# Patient Record
Sex: FEMALE | URBAN - METROPOLITAN AREA
[De-identification: names, ages, dates, MRNs, and addresses within clinical notes are randomized per-mention and may not be internally consistent; named-entity substitution may affect disease eponyms.]

---

## 2017-04-13 ENCOUNTER — IMPORTED ENCOUNTER (OUTPATIENT)
Dept: URBAN - METROPOLITAN AREA CLINIC 31 | Facility: CLINIC | Age: 67
End: 2017-04-13

## 2017-04-13 PROCEDURE — 92310 CONTACT LENS FITTING OU: CPT

## 2017-04-13 PROCEDURE — 92015 DETERMINE REFRACTIVE STATE: CPT

## 2017-04-13 PROCEDURE — 92014 COMPRE OPH EXAM EST PT 1/>: CPT

## 2017-11-29 ENCOUNTER — IMPORTED ENCOUNTER (OUTPATIENT)
Dept: URBAN - METROPOLITAN AREA CLINIC 31 | Facility: CLINIC | Age: 67
End: 2017-11-29

## 2017-11-29 PROBLEM — T15.12XA: Noted: 2017-11-29

## 2017-11-29 PROCEDURE — 99213 OFFICE O/P EST LOW 20 MIN: CPT

## 2017-11-29 NOTE — PATIENT DISCUSSION
1.  A conjunctival foreign body (CL fragment) was present OS --The risks benefits and alternatives of foreign body removal were discussed. Antiobiotic drops were applied and the foreign body was successfully removed. 2. Return for an appointment in 6 months for comprehensive exam. with Dr. Shital Roper

## 2018-01-10 ENCOUNTER — IMPORTED ENCOUNTER (OUTPATIENT)
Dept: URBAN - METROPOLITAN AREA CLINIC 31 | Facility: CLINIC | Age: 68
End: 2018-01-10

## 2018-01-10 PROBLEM — H18.59: Noted: 2018-01-10

## 2018-01-10 PROBLEM — H25.813: Noted: 2018-01-10

## 2018-01-10 PROBLEM — H17.11: Noted: 2018-01-10

## 2018-01-10 PROCEDURE — 92134 CPTRZ OPH DX IMG PST SGM RTA: CPT

## 2018-01-10 PROCEDURE — 92015 DETERMINE REFRACTIVE STATE: CPT

## 2018-01-10 PROCEDURE — 92025 CPTRIZED CORNEAL TOPOGRAPHY: CPT

## 2018-01-10 PROCEDURE — 92014 COMPRE OPH EXAM EST PT 1/>: CPT

## 2018-01-10 NOTE — PATIENT DISCUSSION
1.  Discussed the risks benefits alternatives and limitations of cataract surgery including infection bleeding loss of vision retinal tears detachment. The patient stated a full understanding and a desire to proceed with the procedure in both eyes. Refractive options were reviewed. Patient has elected to have monovision. The patient has tried monovision in the past.  I have recommended ORA guidance to confirm lens power choice. There is no guarantee of perfect uncorrected acuity and the possible need for enhancement was discussed. The patient may need glasses for night driving and to fine tune the vision. 2. 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 Mild ABMD4. Return for an appointment in 1 month for cataract evaluation. with Dr. Lauren Faye.

## 2018-02-20 ENCOUNTER — IMPORTED ENCOUNTER (OUTPATIENT)
Dept: URBAN - METROPOLITAN AREA CLINIC 31 | Facility: CLINIC | Age: 68
End: 2018-02-20

## 2018-02-20 PROBLEM — H17.89: Noted: 2018-02-20

## 2018-02-20 PROBLEM — H17.11: Noted: 2018-02-20

## 2018-02-20 PROBLEM — H25.813: Noted: 2018-02-20

## 2018-02-20 PROBLEM — H04.123: Noted: 2018-02-20

## 2018-02-20 PROCEDURE — 99214 OFFICE O/P EST MOD 30 MIN: CPT

## 2018-02-20 NOTE — PATIENT DISCUSSION
1.  Discussed the risks benefits alternatives and limitations of cataract surgery including infection bleeding loss of vision retinal tears detachment. The patient stated a full understanding and a desire to proceed with the procedure in both eyes. Refractive options were reviewed. Patient has elected to have monovision. The patient has tried monovision in the past.  I have recommended ORA guidance to confirm lens power choice. There is no guarantee of perfect uncorrected acuity and the possible need for enhancement was discussed. The patient may need glasses for night driving and to fine tune the vision. h/o [address] in past pt aware IOL calcs are more difficult and possible need for enhancement following surgerySchedule KPe/IOL OD/OS pore OD reading OS distance2. Dry Eye OU:  Continue current management with Artificial Tears. Pt on medications that are drying try to limit use increase PF tears q2WA3. s/p PRK Monovision. 4. Central Corneal Scar OD - s/p PTK/PRK5. Return for an appointment for [address] [place] [address] with Dr. Rashad Lopez.

## 2018-03-21 ENCOUNTER — IMPORTED ENCOUNTER (OUTPATIENT)
Dept: URBAN - METROPOLITAN AREA CLINIC 31 | Facility: CLINIC | Age: 68
End: 2018-03-21

## 2018-03-21 PROBLEM — H25.813: Noted: 2018-03-21

## 2018-03-21 PROCEDURE — 76519 ECHO EXAM OF EYE: CPT

## 2018-03-21 PROCEDURE — 92025 CPTRIZED CORNEAL TOPOGRAPHY: CPT

## 2018-03-21 NOTE — PATIENT DISCUSSION
Discussed the risks benefits alternatives and limitations of cataract surgery including infection bleeding loss of vision retinal tears detachment. The patient stated a full understanding and a desire to proceed with the procedure in both eyes. Refractive options were reviewed. Patient has elected to have monovision. The patient has tried monovision in the past.  I have recommended ORA guidance to confirm lens power choice. Pt has had Lasik and understands IOL calcs are more difficult and possible need for enhancement for best uncorrected vision will use toric for astigmatism. There is no guarantee of perfect uncorrected acuity and the possible need for enhancement was discussed. The patient may need glasses for night driving and to fine tune the vision.

## 2018-04-10 ENCOUNTER — IMPORTED ENCOUNTER (OUTPATIENT)
Dept: URBAN - METROPOLITAN AREA CLINIC 31 | Facility: CLINIC | Age: 68
End: 2018-04-10

## 2018-04-10 PROBLEM — Z96.1: Noted: 2018-04-10

## 2018-04-10 PROCEDURE — 99024 POSTOP FOLLOW-UP VISIT: CPT

## 2018-04-10 NOTE — PATIENT DISCUSSION
Post-Op Day #1 - Cataract Surgery Right Eye (OD) - PC opening with vitreous prolapse PCL in sulcus with optic cature Tears prn. Continue postop drops as directed. Call office with symptoms of pain redness or decreased vision in operative eye. 1 drop zylet instilled. Explained in detail what happened with surgery. I could not place toric lens because of posterior capsule tear. If uncorrected near vision is not satisfactory may need lasik enhancement in 3 months. Hold on surgery OS until OD stable. Return for an appointment in 1 week for dilated fundus exam and post op refraction. with Dr. Mery Barker. FEP to do digital IOP check prior to dilation.

## 2018-04-17 ENCOUNTER — IMPORTED ENCOUNTER (OUTPATIENT)
Dept: URBAN - METROPOLITAN AREA CLINIC 31 | Facility: CLINIC | Age: 68
End: 2018-04-17

## 2018-04-17 PROBLEM — Z96.1: Noted: 2018-04-17

## 2018-04-17 PROBLEM — H43.811: Noted: 2018-04-17

## 2018-04-17 PROCEDURE — 99024 POSTOP FOLLOW-UP VISIT: CPT

## 2018-04-17 NOTE — PATIENT DISCUSSION
Return for an appointment in 3 weeks for dilated fundus exam and post op refraction. with Dr. Vinicius Pires.

## 2018-04-17 NOTE — PATIENT DISCUSSION
PVD OD: Patient was cautioned to call our office immediately if they experience a substantial change in their symptoms such as an increase in floaters persistent flashes loss of visual field (may appear as a shadow or a curtain) or decrease in visual acuity as these may indicate a retinal tear or detachment.   If this is a new problem patient will need to return for re-examination  as determined by the physician

## 2018-04-17 NOTE — PATIENT DISCUSSION
1.  Post-Op Week #1 - Cataract Surgery Right Eye (OD) - Intraocular lens stable s/p anterior vitrectomy for PC tear and vitreous prolapse. Pt did not start her lotemax start 4x/d Call with any problems. 2. PVD OD: Patient was cautioned to call our office immediately if they experience a substantial change in their symptoms such as an increase in floaters persistent flashes loss of visual field (may appear as a shadow or a curtain) or decrease in visual acuity as these may indicate a retinal tear or detachment. If this is a new problem patient will need to return for re-examination  as determined by the [address] [employer]. Return for an appointment in 3 weeks for dilated fundus exam and post op refraction. with Dr. Abdelrahman Rendon.

## 2018-05-11 ENCOUNTER — IMPORTED ENCOUNTER (OUTPATIENT)
Dept: URBAN - METROPOLITAN AREA CLINIC 31 | Facility: CLINIC | Age: 68
End: 2018-05-11

## 2018-05-11 PROBLEM — Z96.1: Noted: 2018-05-11

## 2018-05-11 PROCEDURE — 99024 POSTOP FOLLOW-UP VISIT: CPT

## 2018-05-11 NOTE — PATIENT DISCUSSION
Post-Op Cataract Surgery 15-90 days Right Eye (OD):  Doing well with stable vision. Ok to schedule surgery OS distance. ORA h/o PRK. no toric standard lens.

## 2018-06-19 ENCOUNTER — IMPORTED ENCOUNTER (OUTPATIENT)
Dept: URBAN - METROPOLITAN AREA CLINIC 31 | Facility: CLINIC | Age: 68
End: 2018-06-19

## 2018-06-19 PROBLEM — H53.15: Noted: 2018-06-19

## 2018-06-19 PROBLEM — Z96.1: Noted: 2018-06-19

## 2018-06-19 PROCEDURE — 99024 POSTOP FOLLOW-UP VISIT: CPT

## 2018-06-19 NOTE — PATIENT DISCUSSION
Visual change: Resolved after few hours ?transient diploplia. Exam today is normal no neurologic symptoms.

## 2018-06-19 NOTE — PATIENT DISCUSSION
1.  Post-Op Cataract Surgery 15-90 days Right Eye (OD):  Doing well with stable vision. 2.  Visual change: Resolved after few hours ?transient diploplia. Exam today is normal no neurologic symptoms. Ok to proceed with cataract surgery OS

## 2018-07-03 ENCOUNTER — IMPORTED ENCOUNTER (OUTPATIENT)
Dept: URBAN - METROPOLITAN AREA CLINIC 31 | Facility: CLINIC | Age: 68
End: 2018-07-03

## 2018-07-03 PROBLEM — Z96.1: Noted: 2018-07-03

## 2018-07-03 PROCEDURE — 99024 POSTOP FOLLOW-UP VISIT: CPT

## 2018-07-03 PROCEDURE — 92134 CPTRZ OPH DX IMG PST SGM RTA: CPT

## 2018-07-03 NOTE — PATIENT DISCUSSION
Post-Op Day #1 - Cataract Surgery Left Eye (OS) - doing well. Tears prn. Continue postop drops as directed. Call office with symptoms of pain redness or decreased vision in operative eye.  1 drop of zylet instilledh/o Lasik monovision OD reading OS distance rare cell OD no CME on OCT restart Lotemax OD qdReturn for an appointment in 1 week for post op refraction. with Dr. Jerod Prather. Return for an appointment in 1 month for post op exam. with Dr. Grace Newell.

## 2018-07-10 ENCOUNTER — IMPORTED ENCOUNTER (OUTPATIENT)
Dept: URBAN - METROPOLITAN AREA CLINIC 31 | Facility: CLINIC | Age: 68
End: 2018-07-10

## 2018-07-10 PROBLEM — Z96.1: Noted: 2018-07-10

## 2018-07-10 PROCEDURE — 99024 POSTOP FOLLOW-UP VISIT: CPT

## 2018-07-10 PROCEDURE — 92134 CPTRZ OPH DX IMG PST SGM RTA: CPT

## 2018-07-10 NOTE — PATIENT DISCUSSION
Post-Op Week #1 - Cataract Surgery Left Eye (OS) and two weeks post cataract surgery OD. OD  sulcus IOL?/PC opening and moderate decrease of BCVA. Etiology unclear.  Normal appearance of macula on OCT and exam. F/u in 1 week with Dr Geoff Royal N: diabetic diet  Ppx: SQH    FULL CODE

## 2018-07-19 ENCOUNTER — IMPORTED ENCOUNTER (OUTPATIENT)
Dept: URBAN - METROPOLITAN AREA CLINIC 31 | Facility: CLINIC | Age: 68
End: 2018-07-19

## 2018-07-19 PROBLEM — H17.89: Noted: 2018-07-19

## 2018-07-19 PROBLEM — Z96.1: Noted: 2018-07-19

## 2018-07-19 PROCEDURE — 99024 POSTOP FOLLOW-UP VISIT: CPT

## 2018-07-19 NOTE — PATIENT DISCUSSION
1.  Post-Op Week #1 - Cataract Surgery Left Eye (OS) -  Intraocular lens stable and surgery very well healed. Patient to resume all normal activities. Finish postop drops as directed. Final Refraction given if necessary. Call with any problems. 2.  s/p PRK monovision residual refractive errorReturn for an appointment in 1 month for dilated fundus exam and post op exam. ARx MRx and Topography. with Dr. Robb Womack.

## 2018-07-19 NOTE — PATIENT DISCUSSION
s/p PRK monovision residual refractive errorReturn for an appointment in 1 month for dilated fundus exam and post op exam. ARx MRx and Topography. with Dr. Shanna Medellin.

## 2018-08-16 ENCOUNTER — IMPORTED ENCOUNTER (OUTPATIENT)
Dept: URBAN - METROPOLITAN AREA CLINIC 31 | Facility: CLINIC | Age: 68
End: 2018-08-16

## 2018-08-16 PROBLEM — H04.123: Noted: 2018-08-16

## 2018-08-16 PROBLEM — H17.89: Noted: 2018-08-16

## 2018-08-16 PROBLEM — Z96.1: Noted: 2018-08-16

## 2018-08-16 PROCEDURE — 99024 POSTOP FOLLOW-UP VISIT: CPT

## 2018-08-16 NOTE — PATIENT DISCUSSION
Return for an appointment in 1 month for dilated fundus exam. OCT Macula. ARx MRx and Topography. with Dr. Camilla Crigler.

## 2018-08-16 NOTE — PATIENT DISCUSSION
s/p PRK monovision treat dryness if uncorrected vision does not improve t/c PRK enhancement in 3 months

## 2018-08-16 NOTE — PATIENT DISCUSSION
Dry Eye OU:  Start Restasis BID. Discussed that Restasis helps to increase the production of the patient's own tears and therefore can take 3-4 months for an improvement in symptoms. Use AT prn.

## 2018-09-18 ENCOUNTER — IMPORTED ENCOUNTER (OUTPATIENT)
Dept: URBAN - METROPOLITAN AREA CLINIC 31 | Facility: CLINIC | Age: 68
End: 2018-09-18

## 2018-09-18 PROBLEM — Z98.89: Noted: 2018-09-18

## 2018-09-18 PROCEDURE — 92134 CPTRZ OPH DX IMG PST SGM RTA: CPT

## 2018-09-18 PROCEDURE — 92025 CPTRIZED CORNEAL TOPOGRAPHY: CPT

## 2018-09-18 PROCEDURE — 99024 POSTOP FOLLOW-UP VISIT: CPT

## 2018-09-18 NOTE — PATIENT DISCUSSION
Post Operative: Doing well po drops as instructed tears prn. Call with any problems. monovision affected by residual astigmatism. t/c PRK enhancement when stable. Dryness improving on tears aloneReturn for an appointment for refractive consult. with Dr. Abdelrahman Rendon.

## 2018-10-16 ENCOUNTER — IMPORTED ENCOUNTER (OUTPATIENT)
Dept: URBAN - METROPOLITAN AREA CLINIC 31 | Facility: CLINIC | Age: 68
End: 2018-10-16

## 2018-10-16 PROBLEM — H52.209: Noted: 2018-10-16

## 2018-10-16 NOTE — PATIENT DISCUSSION
We discussed risks vs. benefits of surgery. The patient has watched and understands the informed consent video. I have discussed the risks of laser refractive surgery with the patient. I informed the patient of the risk of infection (1:1000 for PRK and 1:3000 to 1:5000 for LASIK.)  I informed the patient of the possibility of complications related to creating the flap during LASIK surgery and that such complications may require that completion of their procedure be delayed for months. I have discussed the possibility of postoperative halos starbursts and dryness. I reviewed the risk of corneal ectasia. We discussed the possibility of need for enhancement surgery. I have discussed the need for postoperative reading glasses. I have discussed the possibility for the patient to require glasses and/or contact lenses postoperatively for the clearest vision possible. I have answered all of the patients questions. Schedule PRK enhancement OS/OD 1 month apart. PORE OS plano  OD -2.50Cost $400 per eye.  Valium 10 mg

## 2019-09-30 ENCOUNTER — IMPORTED ENCOUNTER (OUTPATIENT)
Dept: URBAN - METROPOLITAN AREA CLINIC 31 | Facility: CLINIC | Age: 69
End: 2019-09-30

## 2019-09-30 PROBLEM — H04.123: Noted: 2019-09-30

## 2019-09-30 PROBLEM — H17.89: Noted: 2019-09-30

## 2019-09-30 PROBLEM — H17.11: Noted: 2019-09-30

## 2019-09-30 PROBLEM — L71.9: Noted: 2019-09-30

## 2019-09-30 PROBLEM — Z96.1: Noted: 2019-09-30

## 2019-09-30 PROCEDURE — 92014 COMPRE OPH EXAM EST PT 1/>: CPT

## 2019-09-30 PROCEDURE — 92015 DETERMINE REFRACTIVE STATE: CPT

## 2019-09-30 NOTE — PATIENT DISCUSSION
1.  Pseudophakia OU - IOLs stable. Monitor. 2. Dry Eye OU:  Significant improvement since stopping a facial cream that she was getting on [address]. Continue ATs  as needed. 3. Central Corneal Scar OD -  S/P PTK Monitor4.  s/p PRK OU - MONO  Monitor5. Facial Rosacea - Patient found to have skin changes consistsnt with Rosacea. No sign of ocular complications related to rosacea at this time. Continue doxycycline 50 mg PO QD.6. Mild ABMD--7. Pt wants cls for tennis and dist.  Order cls for Mod mono. She will use gls inside house. 8.  RTn fitting dailies.

## 2019-09-30 NOTE — PATIENT DISCUSSION
1.  Dry Eye OU:  Significant improvement since stopping a facial cream that she was getting on [address]. Continue ATs  as needed. 2. Central Corneal Scar OD -  S/P PTK Monitor3.  s/p PRK OU - MONO  Monitor4. Return for an appointment in 6 months for comprehensive exam. with Dr. Waylon Willis. 5.  Facial Rosacea - Patient found to have skin changes consistsnt with Rosacea. No sign of ocular complications related to rosacea at this time.  Continue doxycycline 50 mg PO QD.

## 2019-10-07 ENCOUNTER — IMPORTED ENCOUNTER (OUTPATIENT)
Dept: URBAN - METROPOLITAN AREA CLINIC 31 | Facility: CLINIC | Age: 69
End: 2019-10-07

## 2019-10-07 PROCEDURE — 92310 CONTACT LENS FITTING OU: CPT

## 2019-10-07 NOTE — PATIENT DISCUSSION
1.  Disp trials 10 of OD only for near pl-1.75x180 Moist c;ls. Eval 120. Pt did not pay---Pt did not tolerate lens in left eye. 2.  Pt to call and let me know if she likes.

## 2021-02-18 ENCOUNTER — TELEPHONE ENCOUNTER (OUTPATIENT)
Dept: URBAN - METROPOLITAN AREA CLINIC 9 | Facility: CLINIC | Age: 71
End: 2021-02-18

## 2022-04-01 ASSESSMENT — VISUAL ACUITY
OS_SC: J10
OS_SC: 20/40
OS_SC: 20/40-2
OD_PH: 20/70
OS_CC: 20/40-2
OD_CC: 20/30-2
OD_SC: J7
OS_PH: 20/25
OS_GLARE: 20/30
OD_SC: J5
OD_CC: 20/40-2
OU_CC: 20/30-1
OU_SC: 20/30
OS_CC: 20/30-2
OS_PH: SC 20/30 +2
OS_CC: 20/50
OS_SC: J7
OS_PH: SC 20/25 -3
OS_PH: CC 20/30
OD_PH: SC 20/40 -2
OS_CC: 20/30-2
OD_PH: SC 20/30
OD_CC: 20/70
OS_CC: 20/70
OS_GLARE: 20/30+2
OS_GLARE: 20/70MED
OD_SC: J7+2
OD_PH: SC 20/25 -3
OD_SC: J7+2
OS_CC: 20/40-2
OS_CC: 20/50
OD_SC: 20/70
OD_SC: J2+1
OD_SC: 20/30-2
OD_SC: 20/5016''
OD_CC: 20/200
OD_CC: 20/30
OD_CC: 20/400
OS_CC: 20/40-2
OD_SC: J5
OS_PH: SC 20/30
OS_SC: J111''
OU_SC: 20/5016''
OD_SC: 20/2516''
OS_CC: 20/40+2
OS_CC: 20/50-2
OS_PH: SC 20/30 +2
OS_PH: SC 20/25 -3

## 2022-04-01 ASSESSMENT — TONOMETRY: OD_IOP_MMHG: 15

## 2022-07-30 ENCOUNTER — TELEPHONE ENCOUNTER (OUTPATIENT)
Age: 72
End: 2022-07-30

## 2022-07-30 RX ORDER — WHEAT DEXTRIN 3 G/4 G
1 (ONE) POWDER (GRAM) ORAL DAILY
Qty: 0 | Refills: 2 | OUTPATIENT
Start: 2019-02-01 | End: 2019-03-03

## 2022-07-31 ENCOUNTER — TELEPHONE ENCOUNTER (OUTPATIENT)
Age: 72
End: 2022-07-31

## 2022-07-31 RX ORDER — WHEAT DEXTRIN 3 G/4 G
1 (ONE) POWDER (GRAM) ORAL DAILY
Qty: 0 | Refills: 2 | Status: ACTIVE | COMMUNITY
Start: 2019-02-01

## 2022-09-19 ENCOUNTER — NEW PATIENT (OUTPATIENT)
Dept: URBAN - METROPOLITAN AREA CLINIC 26 | Facility: CLINIC | Age: 72
End: 2022-09-19

## 2022-09-19 VITALS
SYSTOLIC BLOOD PRESSURE: 152 MMHG | WEIGHT: 160 LBS | DIASTOLIC BLOOD PRESSURE: 104 MMHG | BODY MASS INDEX: 29.44 KG/M2 | HEIGHT: 62 IN | HEART RATE: 60 BPM

## 2022-09-19 DIAGNOSIS — H35.373: ICD-10-CM

## 2022-09-19 DIAGNOSIS — H35.443: ICD-10-CM

## 2022-09-19 DIAGNOSIS — H35.363: ICD-10-CM

## 2022-09-19 DIAGNOSIS — H43.812: ICD-10-CM

## 2022-09-19 DIAGNOSIS — H04.123: ICD-10-CM

## 2022-09-19 PROCEDURE — 92004 COMPRE OPH EXAM NEW PT 1/>: CPT

## 2022-09-19 PROCEDURE — 92134 CPTRZ OPH DX IMG PST SGM RTA: CPT

## 2022-09-19 ASSESSMENT — TONOMETRY
OD_IOP_MMHG: 14
OS_IOP_MMHG: 15

## 2022-09-19 ASSESSMENT — VISUAL ACUITY
OS_SC: 20/25-2
OD_CC: 20/30+2
OD_SC: 20/80+2
OS_CC: 20/20

## 2022-09-19 NOTE — PATIENT DISCUSSION
9/19/22: PT REPORTS HAVING HAD 2 LASER PROCEDURES IN THE CORNEA, WHICH MAY HAVE CONTRIBUTED TO THE SCARRING, SOMEWHAT CENTRAL. AT THIS POINT, THIS IS THE MOST LIKELY CAUSE OF INTERFERENCE W VA. CONT LUBRICATING.

## 2022-10-07 NOTE — PATIENT DISCUSSION
Patient doing well with current glasses. Does not want to update glasses at this time secondary to vision fluctuating  from chemo tx for lung cancer.